# Patient Record
Sex: FEMALE | Race: WHITE
[De-identification: names, ages, dates, MRNs, and addresses within clinical notes are randomized per-mention and may not be internally consistent; named-entity substitution may affect disease eponyms.]

---

## 2020-03-10 ENCOUNTER — HOSPITAL ENCOUNTER (OUTPATIENT)
Dept: HOSPITAL 75 - RAD | Age: 80
End: 2020-03-10
Attending: INTERNAL MEDICINE
Payer: MEDICARE

## 2020-03-10 DIAGNOSIS — E89.0: Primary | ICD-10-CM

## 2020-03-10 PROCEDURE — 76536 US EXAM OF HEAD AND NECK: CPT

## 2020-03-10 NOTE — DIAGNOSTIC IMAGING REPORT
INDICATION: Hypothyroidism. Previous thyroidectomy; partial

versus complete unknown.



TECHNIQUE: Limited focused sonographic evaluation of the anterior

neck was performed.



FINDINGS: Sonographic evaluation of the anterior neck in the

region of the thyroid was performed. No appreciable thyroid

tissue is identified on either side. No suspicious adenopathy is

seen. Additionally, no solid masses or suspicious fluid

collections are noted.



IMPRESSION:

1. No evidence of thyroid tissue.

2. No other suspicious solid masses or fluid collections in the

neck.



Dictated by: 



  Dictated on workstation # [id number]

## 2021-03-08 ENCOUNTER — HOSPITAL ENCOUNTER (OUTPATIENT)
Dept: HOSPITAL 75 - RAD | Age: 81
End: 2021-03-08
Attending: INTERNAL MEDICINE
Payer: MEDICARE

## 2021-03-08 DIAGNOSIS — Z90.89: ICD-10-CM

## 2021-03-08 DIAGNOSIS — C73: Primary | ICD-10-CM

## 2021-03-08 PROCEDURE — 76536 US EXAM OF HEAD AND NECK: CPT

## 2021-03-08 NOTE — DIAGNOSTIC IMAGING REPORT
Indication: Thyroid carcinoma status post thyroidectomy 8 years

ago.



Sonographic interrogation of the thyroid bed was performed. No

residual or recurrent thyroid mass is identified. No fluid

collections are seen.



IMPRESSION: No residual or recurrent thyroid mass is detected.



Dictated by: 



  Dictated on workstation # [id number]

## 2022-03-01 ENCOUNTER — HOSPITAL ENCOUNTER (OUTPATIENT)
Dept: HOSPITAL 75 - RAD | Age: 82
End: 2022-03-01
Attending: NURSE PRACTITIONER
Payer: MEDICARE

## 2022-03-01 DIAGNOSIS — C73: Primary | ICD-10-CM

## 2022-03-01 PROCEDURE — 76536 US EXAM OF HEAD AND NECK: CPT

## 2022-03-01 NOTE — DIAGNOSTIC IMAGING REPORT
HISTORY: 

Papillary carcinoma.



COMPARISON: 

03/08/2021.



TECHNIQUE: 

Ultrasound of the soft tissues of the neck.



FINDINGS:

The thyroid has been removed. No residual thyroid is seen. No

masses are seen. No lymphadenopathy is appreciated. There is some

right carotid atherosclerosis.



IMPRESSION: 

No residual thyroid seen. No evidence of recurrence or local

metastasis.



Dictated by: 



  Dictated on workstation # [id number]

## 2023-03-08 ENCOUNTER — HOSPITAL ENCOUNTER (OUTPATIENT)
Dept: HOSPITAL 75 - RAD | Age: 83
End: 2023-03-08
Attending: NURSE PRACTITIONER
Payer: MEDICARE

## 2023-03-08 DIAGNOSIS — Z85.850: ICD-10-CM

## 2023-03-08 DIAGNOSIS — E89.0: Primary | ICD-10-CM

## 2023-03-08 PROCEDURE — 76536 US EXAM OF HEAD AND NECK: CPT

## 2023-03-08 NOTE — DIAGNOSTIC IMAGING REPORT
INDICATION: Thyroid cancer.



COMPARISON: 03/01/2022.



FINDINGS: Ultrasonography in the thyroid bed reveals no evidence

of thyroid tissue or mass. No pathologic adenopathy is seen.



IMPRESSION: No suspicious finding in the thyroid bed.



  Dictated on workstation # [id number]

## 2023-05-24 ENCOUNTER — HOSPITAL ENCOUNTER (OUTPATIENT)
Dept: HOSPITAL 75 - RAD | Age: 83
End: 2023-05-24
Attending: INTERNAL MEDICINE
Payer: MEDICARE

## 2023-05-24 DIAGNOSIS — C73: ICD-10-CM

## 2023-05-24 DIAGNOSIS — K44.9: ICD-10-CM

## 2023-05-24 DIAGNOSIS — E89.0: ICD-10-CM

## 2023-05-24 DIAGNOSIS — J21.9: Primary | ICD-10-CM

## 2023-05-24 LAB
BUN/CREAT SERPL: 15
CREAT SERPL-MCNC: 0.84 MG/DL (ref 0.6–1.3)
GFR SERPLBLD BASED ON 1.73 SQ M-ARVRAT: 70 ML/MIN

## 2023-05-24 PROCEDURE — 70491 CT SOFT TISSUE NECK W/DYE: CPT

## 2023-05-24 PROCEDURE — 36415 COLL VENOUS BLD VENIPUNCTURE: CPT

## 2023-05-24 PROCEDURE — 71260 CT THORAX DX C+: CPT

## 2023-05-24 PROCEDURE — 84520 ASSAY OF UREA NITROGEN: CPT

## 2023-05-24 PROCEDURE — 82565 ASSAY OF CREATININE: CPT

## 2023-05-24 NOTE — DIAGNOSTIC IMAGING REPORT
CT NECK/CHEST W



INDICATION: Thyroid cancer.



COMPARISON: None available.



TECHNIQUE: CT imaging of the neck and chest was performed with IV

contrast. Automatic exposure controls were utilized to keep dose

as low as reasonably achievable.



FINDINGS:



NECK: Status post thyroidectomy. There is no soft tissue mass in

the thyroid bed that would suggest local recurrence. No cervical

lymphadenopathy.



Right-sided carotid endarterectomy has been performed.

Atherosclerotic plaquing in the left proximal ICA causes less

than 50% stenosis. The right parotid gland is normal. The left

parotid gland is atrophic. Base of the tongue is normal.

Epiglottis is not thickened. Soft palate is unremarkable. The

nasopharynx is normal. No concerning focal osseous lesion within

the cervical spine. Visualized portions of brain have no

hydrocephalus or vasogenic edema.



CHEST: No abnormality in the trachea. There is no pneumonia or

edema. Mild biapical subpleural scarring. There are no suspicious

pulmonary nodules. Clustered micronodules in the right upper lobe

are most compatible with cellular bronchiolitis. No pleural

effusion or pneumothorax.



No supraclavicular or axillary lymphadenopathy. No mediastinal or

hilar lymphadenopathy. The heart is enlarged without pericardial

effusion. TAVR has been performed. Moderate-sized paraesophageal

hiatal hernia. Normal-caliber thoracic aorta without dissection.

No worrisome focal osseous lesions in the chest. Visualized

aspects of the upper abdomen show no concerning abnormality.



IMPRESSION:

1. Thyroidectomy without features of local recurrence.

2. No metastatic disease in the neck or chest.

3. Right upper lobe cellular bronchiolitis may be due to

endobronchial infection or aspiration. Of note, the patient does

have a moderate-sized hiatal hernia.



Dictated by: 



  Dictated on workstation # [id number]